# Patient Record
Sex: FEMALE | Race: BLACK OR AFRICAN AMERICAN | NOT HISPANIC OR LATINO | URBAN - METROPOLITAN AREA
[De-identification: names, ages, dates, MRNs, and addresses within clinical notes are randomized per-mention and may not be internally consistent; named-entity substitution may affect disease eponyms.]

---

## 2019-11-12 ENCOUNTER — IMPORTED ENCOUNTER (OUTPATIENT)
Dept: URBAN - METROPOLITAN AREA CLINIC 9 | Facility: CLINIC | Age: 79
End: 2019-11-12

## 2020-08-07 NOTE — PATIENT DISCUSSION
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 R/B/A to transplant vs drop treatment discussed with patient. Patient agrees to try drop treatment prior to repeating corneal transplant.

## 2020-08-07 NOTE — PATIENT DISCUSSION
Recommend warm compresses, sterilid lid scrubs, PF artificial tears, and PP today. +Restasis BID OU. R/B/A to plugs discussed with patient. Patient elects to proceed with plugs today. Inserted with no complication.

## 2020-08-07 NOTE — PROCEDURE NOTE: CLINICAL
PROCEDURE NOTE: Punctal Plugs, Silicone OU. Diagnosis: Dry Eye Syndrome. Anesthesia: Topical. Prep: Betadine Flush. Prior to treatment, the risks/benefits/alternatives were discussed. The patient wished to proceed with procedure. Permanent silicone plugs were inserted. Size/location of plugs inserted: BLL / 0.6. Patient tolerated procedure well. There were no complications. Post procedure instructions given. Ceci Hollis

## 2020-08-27 NOTE — PATIENT DISCUSSION
Tight suture at 10:00 o clock removed today at slit lamp with no complications.  Moxi 4x a day for 4 days following suture removal.

## 2020-08-27 NOTE — PATIENT DISCUSSION
bullae centraly. pt feels better, however, has not yet obtained jenn. Plan: Decrease Pred to QID for one month, TID for one month, BID for one month, and stay at BID.  Stressed the importance of this ointment for swelling. +Jenn ointment qhs-TID OD.

## 2020-10-08 NOTE — PATIENT DISCUSSION
bullae centraly. pt feels better, however, has discomfort OD often and has not yet obtained jenn ointment. (Recommended looking online) Plan: Continue Pred at BID OS.  Stressed the importance of this ointment for swelling. +Ejnn ointment qhs-TID OD.

## 2020-10-08 NOTE — PATIENT DISCUSSION
[facility] for RNFL and glaucoma care (patient has not complied yet) (Please do not dilate for IOP checks) (Pt requests [facility] only).

## 2020-10-08 NOTE — PATIENT DISCUSSION
Recommend warm compresses, sterilid lid scrubs, PF artificial tears, and PP today. +Restasis BID OU.

## 2020-10-08 NOTE — PATIENT DISCUSSION
Patient defers all drops and pressure today, against Doctors advice. (Pt needs pressure check due to glaucoma). Pt has also not seen glaucoma doctor.

## 2020-11-06 ENCOUNTER — IMPORTED ENCOUNTER (OUTPATIENT)
Dept: URBAN - METROPOLITAN AREA CLINIC 9 | Facility: CLINIC | Age: 80
End: 2020-11-06

## 2020-11-06 PROBLEM — E11.9: Noted: 2020-11-06

## 2020-11-06 PROBLEM — H25.13: Noted: 2020-11-06

## 2021-01-21 NOTE — PATIENT DISCUSSION
Chronic condition treated with levothyroxine.  Resumed maintenance medication on admission.   Pred forte q2hrs for 2-3 weeks OD.  +Linda ointment QHS OD.

## 2021-10-16 ASSESSMENT — VISUAL ACUITY
OS_CC: 20/25 SN
OD_CC: 20/40 SN
OS_CC: 20/30 SN
OD_CC: 20/30 SN
OS_CC: 20/30 SN
OS_CC: 20/50 SN
OD_CC: 20/30 - SN

## 2021-10-16 ASSESSMENT — TONOMETRY
OD_IOP_MMHG: 17
OD_IOP_MMHG: 14
OS_IOP_MMHG: 16
OS_IOP_MMHG: 18

## 2021-11-29 ENCOUNTER — ESTABLISHED PATIENT (OUTPATIENT)
Dept: URBAN - METROPOLITAN AREA CLINIC 6 | Facility: CLINIC | Age: 81
End: 2021-11-29

## 2021-11-29 DIAGNOSIS — H25.13: ICD-10-CM

## 2021-11-29 DIAGNOSIS — E11.9: ICD-10-CM

## 2021-11-29 DIAGNOSIS — H04.123: ICD-10-CM

## 2021-11-29 PROCEDURE — 92015 DETERMINE REFRACTIVE STATE: CPT

## 2021-11-29 PROCEDURE — 92014 COMPRE OPH EXAM EST PT 1/>: CPT

## 2021-11-29 ASSESSMENT — VISUAL ACUITY
OU_CC: 20/40
OS_PH: 20/40
OD_CC: 20/50
OU_SC: J1
OS_CC: 20/40
OD_PH: 20/50+1

## 2021-11-29 ASSESSMENT — TONOMETRY
OD_IOP_MMHG: 16
OS_IOP_MMHG: 18

## 2022-12-23 ENCOUNTER — ESTABLISHED PATIENT (OUTPATIENT)
Dept: URBAN - METROPOLITAN AREA CLINIC 6 | Facility: CLINIC | Age: 82
End: 2022-12-23

## 2022-12-23 PROCEDURE — 92014 COMPRE OPH EXAM EST PT 1/>: CPT

## 2022-12-23 ASSESSMENT — TONOMETRY
OS_IOP_MMHG: 18
OD_IOP_MMHG: 17

## 2022-12-23 ASSESSMENT — VISUAL ACUITY
OS_CC: 20/40
OD_GLARE: 20/100
OS_GLARE: 20/70
OD_CC: 20/40

## 2024-01-17 ENCOUNTER — ESTABLISHED PATIENT (OUTPATIENT)
Dept: URBAN - METROPOLITAN AREA CLINIC 10 | Facility: CLINIC | Age: 84
End: 2024-01-17

## 2024-01-17 DIAGNOSIS — H25.13: ICD-10-CM

## 2024-01-17 DIAGNOSIS — H04.123: ICD-10-CM

## 2024-01-17 DIAGNOSIS — E11.9: ICD-10-CM

## 2024-01-17 PROCEDURE — 92014 COMPRE OPH EXAM EST PT 1/>: CPT

## 2024-01-17 PROCEDURE — 92015 DETERMINE REFRACTIVE STATE: CPT

## 2024-01-17 ASSESSMENT — TONOMETRY
OD_IOP_MMHG: 17
OS_IOP_MMHG: 18

## 2024-01-17 ASSESSMENT — VISUAL ACUITY
OS_CC: 20/40
OD_CC: 20/40
OD_GLARE: 20/70
OS_GLARE: 20/70